# Patient Record
Sex: MALE | Race: WHITE
[De-identification: names, ages, dates, MRNs, and addresses within clinical notes are randomized per-mention and may not be internally consistent; named-entity substitution may affect disease eponyms.]

---

## 2020-03-11 ENCOUNTER — HOSPITAL ENCOUNTER (OUTPATIENT)
Dept: HOSPITAL 74 - FASU | Age: 60
Discharge: HOME | End: 2020-03-11
Attending: ORTHOPAEDIC SURGERY
Payer: COMMERCIAL

## 2020-03-11 VITALS — SYSTOLIC BLOOD PRESSURE: 132 MMHG | HEART RATE: 66 BPM | DIASTOLIC BLOOD PRESSURE: 84 MMHG

## 2020-03-11 VITALS — TEMPERATURE: 98.2 F

## 2020-03-11 VITALS — BODY MASS INDEX: 34.7 KG/M2

## 2020-03-11 DIAGNOSIS — M65.341: Primary | ICD-10-CM

## 2020-03-11 PROCEDURE — 0LN70ZZ RELEASE RIGHT HAND TENDON, OPEN APPROACH: ICD-10-PCS | Performed by: ORTHOPAEDIC SURGERY

## 2020-03-12 NOTE — OP
DATE OF OPERATION:  03/11/2020

 

PREOPERATIVE DIAGNOSIS:  Right ring trigger finger.

 

POSTOPERATIVE DIAGNOSIS:  Right ring trigger finger.

 

OPERATIVE PROCEDURE:  Right ring trigger finger release.

 

ANESTHESIA:  Local with sedation.

 

COMPLICATIONS:  None.

 

ESTIMATED BLOOD LOSS:  Minimal.

 

INDICATION FOR PROCEDURE:  The patient is a 59-year-old male with the above finding,

indicated for operative treatment.  Risks, benefits, and alternatives were discussed

with patient at length.  Proper informed consent was obtained.

 

PROCEDURE:  After proper identification of patient and correct operative site,

patient was brought to the operating room, placed supine on the operating table.  All

prominences were well padded.  Right upper extremity was prepped and draped in usual

sterile fashion.  Tourniquet was inflated to 250 mmHg.

 

A longitudinal incision was made over the A1 pulley of the ring finger.  Incision was

taken sharply through the skin and blunt and sharp dissection down to the

subcutaneous tissues.  The A1 pulley was identified and divided.  No further

triggering occurred.  Wound was repaired with 5-0 fast-absorbing plain gut suture. 

Sterile dressings were applied.  Patient was brought to recovery in stable condition.

 He tolerated the procedure well.

 

 

GILBERTO ALVARADO M.D.

 

[id number]

DD: 03/12/2020 13:20

DT: 03/12/2020 13:58

Job #:  [id number]